# Patient Record
Sex: FEMALE | URBAN - METROPOLITAN AREA
[De-identification: names, ages, dates, MRNs, and addresses within clinical notes are randomized per-mention and may not be internally consistent; named-entity substitution may affect disease eponyms.]

---

## 2022-09-07 ENCOUNTER — OFFICE (OUTPATIENT)
Dept: URBAN - METROPOLITAN AREA CLINIC 28 | Facility: CLINIC | Age: 32
Setting detail: OPHTHALMOLOGY
End: 2022-09-07
Payer: COMMERCIAL

## 2022-09-07 DIAGNOSIS — H16.041: ICD-10-CM

## 2022-09-07 PROCEDURE — 92002 INTRM OPH EXAM NEW PATIENT: CPT | Performed by: OPHTHALMOLOGY

## 2022-09-07 ASSESSMENT — REFRACTION_CURRENTRX
OS_CYLINDER: 0.00
OD_OVR_VA: 20/
OD_CYLINDER: 0.00
OD_SPHERE: -1.75
OS_SPHERE: -1.75
OS_OVR_VA: 20/
OD_AXIS: 180
OS_AXIS: 180

## 2022-09-07 ASSESSMENT — REFRACTION_AUTOREFRACTION
OS_CYLINDER: -0.50
OD_CYLINDER: -1.00
OD_AXIS: 164
OS_AXIS: 009
OS_SPHERE: -2.75
OD_SPHERE: -2.25

## 2022-09-07 ASSESSMENT — SPHEQUIV_DERIVED
OS_SPHEQUIV: -3
OD_SPHEQUIV: -2.75

## 2022-09-07 ASSESSMENT — CONFRONTATIONAL VISUAL FIELD TEST (CVF)
OS_FINDINGS: FULL
OD_FINDINGS: FULL

## 2022-09-07 ASSESSMENT — KERATOMETRY
OS_K1POWER_DIOPTERS: 44.50
OD_K2POWER_DIOPTERS: 46.00
OS_AXISANGLE_DEGREES: 102
OD_AXISANGLE_DEGREES: 079
OD_K1POWER_DIOPTERS: 44.75
OS_K2POWER_DIOPTERS: 45.50

## 2022-09-07 ASSESSMENT — TONOMETRY
OS_IOP_MMHG: 13
OD_IOP_MMHG: 10

## 2022-09-07 ASSESSMENT — AXIALLENGTH_DERIVED
OD_AL: 23.9809
OS_AL: 24.2264

## 2022-09-07 ASSESSMENT — VISUAL ACUITY
OS_BCVA: 20/50-1
OD_BCVA: 20/70

## 2022-09-12 ENCOUNTER — OFFICE (OUTPATIENT)
Dept: URBAN - METROPOLITAN AREA CLINIC 28 | Facility: CLINIC | Age: 32
Setting detail: OPHTHALMOLOGY
End: 2022-09-12
Payer: COMMERCIAL

## 2022-09-12 DIAGNOSIS — H16.041: ICD-10-CM

## 2022-09-12 PROCEDURE — 92012 INTRM OPH EXAM EST PATIENT: CPT | Performed by: OPHTHALMOLOGY

## 2022-09-12 ASSESSMENT — AXIALLENGTH_DERIVED
OD_AL: 23.9393
OS_AL: 24.1751

## 2022-09-12 ASSESSMENT — REFRACTION_CURRENTRX
OS_OVR_VA: 20/
OS_SPHERE: -1.75
OS_CYLINDER: 0.00
OS_AXIS: 180
OD_OVR_VA: 20/
OD_AXIS: 180
OD_SPHERE: -1.75
OD_CYLINDER: 0.00

## 2022-09-12 ASSESSMENT — CONFRONTATIONAL VISUAL FIELD TEST (CVF)
OD_FINDINGS: FULL
OS_FINDINGS: FULL

## 2022-09-12 ASSESSMENT — KERATOMETRY
OS_K2POWER_DIOPTERS: 45.50
OD_K1POWER_DIOPTERS: 45.25
OD_K2POWER_DIOPTERS: 46.25
OS_AXISANGLE_DEGREES: 098
OS_K1POWER_DIOPTERS: 44.50
OD_AXISANGLE_DEGREES: 087

## 2022-09-12 ASSESSMENT — REFRACTION_AUTOREFRACTION
OS_AXIS: 005
OS_CYLINDER: -0.25
OD_AXIS: 175
OD_CYLINDER: -0.50
OS_SPHERE: -2.75
OD_SPHERE: -2.75

## 2022-09-12 ASSESSMENT — VISUAL ACUITY
OD_BCVA: 20/70-1
OS_BCVA: 20/50-1

## 2022-09-12 ASSESSMENT — CORNEAL SURGICAL SCARRING: OD_SCARRING: ANTERIOR STROMAL

## 2022-09-12 ASSESSMENT — SPHEQUIV_DERIVED
OD_SPHEQUIV: -3
OS_SPHEQUIV: -2.875

## 2022-09-12 ASSESSMENT — TONOMETRY: OD_IOP_MMHG: 12

## 2022-10-06 ENCOUNTER — OFFICE (OUTPATIENT)
Dept: URBAN - METROPOLITAN AREA CLINIC 28 | Facility: CLINIC | Age: 32
Setting detail: OPHTHALMOLOGY
End: 2022-10-06
Payer: COMMERCIAL

## 2022-10-06 DIAGNOSIS — H17.821: ICD-10-CM

## 2022-10-06 DIAGNOSIS — H16.223: ICD-10-CM

## 2022-10-06 PROBLEM — H16.041 CORNEAL ULCER; RIGHT EYE: Status: RESOLVED | Noted: 2022-09-07 | Resolved: 2022-10-06

## 2022-10-06 PROCEDURE — 92014 COMPRE OPH EXAM EST PT 1/>: CPT | Performed by: OPHTHALMOLOGY

## 2022-10-06 ASSESSMENT — REFRACTION_CURRENTRX
OS_OVR_VA: 20/
OS_CYLINDER: 0.00
OD_AXIS: 180
OD_OVR_VA: 20/
OS_AXIS: 180
OD_SPHERE: -1.75
OS_SPHERE: -1.75
OD_CYLINDER: 0.00

## 2022-10-06 ASSESSMENT — KERATOMETRY
OD_K1POWER_DIOPTERS: 44.75
OD_K2POWER_DIOPTERS: 46.00
OS_K1POWER_DIOPTERS: 44.50
OS_K2POWER_DIOPTERS: 45.50
OD_AXISANGLE_DEGREES: 083
OS_AXISANGLE_DEGREES: 099

## 2022-10-06 ASSESSMENT — TONOMETRY
OS_IOP_MMHG: 13
OD_IOP_MMHG: 15

## 2022-10-06 ASSESSMENT — CONFRONTATIONAL VISUAL FIELD TEST (CVF)
OS_FINDINGS: FULL
OD_FINDINGS: FULL

## 2022-10-06 ASSESSMENT — VISUAL ACUITY
OD_BCVA: 20/50-1
OS_BCVA: 20/50-1

## 2022-10-06 ASSESSMENT — SPHEQUIV_DERIVED
OS_SPHEQUIV: -3.25
OD_SPHEQUIV: -3.125

## 2022-10-06 ASSESSMENT — REFRACTION_AUTOREFRACTION
OS_CYLINDER: -0.50
OD_SPHERE: -2.75
OD_CYLINDER: -0.75
OS_AXIS: 002
OS_SPHERE: -3.00
OD_AXIS: 170

## 2022-10-06 ASSESSMENT — CORNEAL SURGICAL SCARRING: OD_SCARRING: ANTERIOR STROMAL

## 2022-10-06 ASSESSMENT — AXIALLENGTH_DERIVED
OD_AL: 24.1328
OS_AL: 24.3295

## 2022-10-06 ASSESSMENT — SUPERFICIAL PUNCTATE KERATITIS (SPK)
OD_SPK: T
OS_SPK: T

## 2022-10-06 ASSESSMENT — LID EXAM ASSESSMENTS
OS_MEIBOMITIS: 1+
OD_MEIBOMITIS: 1+

## 2023-11-15 ENCOUNTER — ASOB RESULT (OUTPATIENT)
Age: 33
End: 2023-11-15

## 2023-11-15 ENCOUNTER — APPOINTMENT (OUTPATIENT)
Dept: ANTEPARTUM | Facility: CLINIC | Age: 33
End: 2023-11-15
Payer: COMMERCIAL

## 2023-11-15 ENCOUNTER — TRANSCRIPTION ENCOUNTER (OUTPATIENT)
Age: 33
End: 2023-11-15

## 2023-11-15 PROCEDURE — 76805 OB US >/= 14 WKS SNGL FETUS: CPT

## 2023-11-15 PROCEDURE — 76817 TRANSVAGINAL US OBSTETRIC: CPT

## 2024-04-16 ENCOUNTER — APPOINTMENT (OUTPATIENT)
Dept: ANTEPARTUM | Facility: CLINIC | Age: 34
End: 2024-04-16
Payer: COMMERCIAL

## 2024-04-16 ENCOUNTER — ASOB RESULT (OUTPATIENT)
Age: 34
End: 2024-04-16

## 2024-04-16 PROCEDURE — 76819 FETAL BIOPHYS PROFIL W/O NST: CPT

## 2024-04-16 PROCEDURE — 76816 OB US FOLLOW-UP PER FETUS: CPT

## 2024-04-16 PROCEDURE — 76820 UMBILICAL ARTERY ECHO: CPT | Mod: 59

## 2024-05-05 ENCOUNTER — TRANSCRIPTION ENCOUNTER (OUTPATIENT)
Age: 34
End: 2024-05-05

## 2024-05-06 ENCOUNTER — INPATIENT (INPATIENT)
Facility: HOSPITAL | Age: 34
LOS: 1 days | Discharge: HOME | End: 2024-05-08
Attending: OBSTETRICS & GYNECOLOGY | Admitting: OBSTETRICS & GYNECOLOGY
Payer: COMMERCIAL

## 2024-05-06 VITALS
RESPIRATION RATE: 18 BRPM | HEART RATE: 72 BPM | DIASTOLIC BLOOD PRESSURE: 65 MMHG | TEMPERATURE: 98 F | SYSTOLIC BLOOD PRESSURE: 97 MMHG | OXYGEN SATURATION: 100 %

## 2024-05-06 LAB
BASOPHILS # BLD AUTO: 0.02 K/UL — SIGNIFICANT CHANGE UP (ref 0–0.2)
BASOPHILS NFR BLD AUTO: 0.2 % — SIGNIFICANT CHANGE UP (ref 0–2)
BLD GP AB SCN SERPL QL: NEGATIVE — SIGNIFICANT CHANGE UP
EOSINOPHIL # BLD AUTO: 0.02 K/UL — SIGNIFICANT CHANGE UP (ref 0–0.5)
EOSINOPHIL NFR BLD AUTO: 0.2 % — SIGNIFICANT CHANGE UP (ref 0–6)
HCT VFR BLD CALC: 35.9 % — SIGNIFICANT CHANGE UP (ref 34.5–45)
HGB BLD-MCNC: 11.6 G/DL — SIGNIFICANT CHANGE UP (ref 11.5–15.5)
IMM GRANULOCYTES NFR BLD AUTO: 0.4 % — SIGNIFICANT CHANGE UP (ref 0–0.9)
LYMPHOCYTES # BLD AUTO: 1.31 K/UL — SIGNIFICANT CHANGE UP (ref 1–3.3)
LYMPHOCYTES # BLD AUTO: 14.1 % — SIGNIFICANT CHANGE UP (ref 13–44)
MCHC RBC-ENTMCNC: 29 PG — SIGNIFICANT CHANGE UP (ref 27–34)
MCHC RBC-ENTMCNC: 32.3 GM/DL — SIGNIFICANT CHANGE UP (ref 32–36)
MCV RBC AUTO: 89.8 FL — SIGNIFICANT CHANGE UP (ref 80–100)
MONOCYTES # BLD AUTO: 0.45 K/UL — SIGNIFICANT CHANGE UP (ref 0–0.9)
MONOCYTES NFR BLD AUTO: 4.8 % — SIGNIFICANT CHANGE UP (ref 2–14)
NEUTROPHILS # BLD AUTO: 7.48 K/UL — HIGH (ref 1.8–7.4)
NEUTROPHILS NFR BLD AUTO: 80.3 % — HIGH (ref 43–77)
NRBC # BLD: 0 /100 WBCS — SIGNIFICANT CHANGE UP (ref 0–0)
PLATELET # BLD AUTO: 157 K/UL — SIGNIFICANT CHANGE UP (ref 150–400)
RBC # BLD: 4 M/UL — SIGNIFICANT CHANGE UP (ref 3.8–5.2)
RBC # FLD: 13.8 % — SIGNIFICANT CHANGE UP (ref 10.3–14.5)
RH IG SCN BLD-IMP: POSITIVE — SIGNIFICANT CHANGE UP
RH IG SCN BLD-IMP: POSITIVE — SIGNIFICANT CHANGE UP
WBC # BLD: 9.32 K/UL — SIGNIFICANT CHANGE UP (ref 3.8–10.5)
WBC # FLD AUTO: 9.32 K/UL — SIGNIFICANT CHANGE UP (ref 3.8–10.5)

## 2024-05-06 RX ORDER — PRAMOXINE HYDROCHLORIDE 150 MG/15G
1 AEROSOL, FOAM RECTAL EVERY 4 HOURS
Refills: 0 | Status: DISCONTINUED | OUTPATIENT
Start: 2024-05-06 | End: 2024-05-08

## 2024-05-06 RX ORDER — LANOLIN
1 OINTMENT (GRAM) TOPICAL EVERY 6 HOURS
Refills: 0 | Status: DISCONTINUED | OUTPATIENT
Start: 2024-05-06 | End: 2024-05-08

## 2024-05-06 RX ORDER — AER TRAVELER 0.5 G/1
1 SOLUTION RECTAL; TOPICAL EVERY 4 HOURS
Refills: 0 | Status: DISCONTINUED | OUTPATIENT
Start: 2024-05-06 | End: 2024-05-08

## 2024-05-06 RX ORDER — TETANUS TOXOID, REDUCED DIPHTHERIA TOXOID AND ACELLULAR PERTUSSIS VACCINE, ADSORBED 5; 2.5; 8; 8; 2.5 [IU]/.5ML; [IU]/.5ML; UG/.5ML; UG/.5ML; UG/.5ML
0.5 SUSPENSION INTRAMUSCULAR ONCE
Refills: 0 | Status: DISCONTINUED | OUTPATIENT
Start: 2024-05-06 | End: 2024-05-08

## 2024-05-06 RX ORDER — CITRIC ACID/SODIUM CITRATE 300-500 MG
15 SOLUTION, ORAL ORAL EVERY 6 HOURS
Refills: 0 | Status: DISCONTINUED | OUTPATIENT
Start: 2024-05-06 | End: 2024-05-06

## 2024-05-06 RX ORDER — HYDROCORTISONE 1 %
1 OINTMENT (GRAM) TOPICAL EVERY 6 HOURS
Refills: 0 | Status: DISCONTINUED | OUTPATIENT
Start: 2024-05-06 | End: 2024-05-08

## 2024-05-06 RX ORDER — MAGNESIUM HYDROXIDE 400 MG/1
30 TABLET, CHEWABLE ORAL
Refills: 0 | Status: DISCONTINUED | OUTPATIENT
Start: 2024-05-06 | End: 2024-05-08

## 2024-05-06 RX ORDER — SODIUM CHLORIDE 9 MG/ML
3 INJECTION INTRAMUSCULAR; INTRAVENOUS; SUBCUTANEOUS EVERY 8 HOURS
Refills: 0 | Status: DISCONTINUED | OUTPATIENT
Start: 2024-05-06 | End: 2024-05-08

## 2024-05-06 RX ORDER — CHLORHEXIDINE GLUCONATE 213 G/1000ML
1 SOLUTION TOPICAL DAILY
Refills: 0 | Status: DISCONTINUED | OUTPATIENT
Start: 2024-05-06 | End: 2024-05-06

## 2024-05-06 RX ORDER — OXYTOCIN 10 UNIT/ML
333.33 VIAL (ML) INJECTION
Qty: 20 | Refills: 0 | Status: DISCONTINUED | OUTPATIENT
Start: 2024-05-06 | End: 2024-05-06

## 2024-05-06 RX ORDER — SIMETHICONE 80 MG/1
80 TABLET, CHEWABLE ORAL EVERY 4 HOURS
Refills: 0 | Status: DISCONTINUED | OUTPATIENT
Start: 2024-05-06 | End: 2024-05-08

## 2024-05-06 RX ORDER — FENTANYL/BUPIVACAINE/NS/PF 2MCG/ML-.1
250 PLASTIC BAG, INJECTION (ML) INJECTION
Refills: 0 | Status: DISCONTINUED | OUTPATIENT
Start: 2024-05-06 | End: 2024-05-06

## 2024-05-06 RX ORDER — BENZOCAINE 10 %
1 GEL (GRAM) MUCOUS MEMBRANE EVERY 6 HOURS
Refills: 0 | Status: DISCONTINUED | OUTPATIENT
Start: 2024-05-06 | End: 2024-05-08

## 2024-05-06 RX ORDER — DIBUCAINE 1 %
1 OINTMENT (GRAM) RECTAL EVERY 6 HOURS
Refills: 0 | Status: DISCONTINUED | OUTPATIENT
Start: 2024-05-06 | End: 2024-05-08

## 2024-05-06 RX ORDER — OXYTOCIN 10 UNIT/ML
41.67 VIAL (ML) INJECTION
Qty: 20 | Refills: 0 | Status: DISCONTINUED | OUTPATIENT
Start: 2024-05-06 | End: 2024-05-08

## 2024-05-06 RX ORDER — OXYTOCIN 10 UNIT/ML
2 VIAL (ML) INJECTION
Qty: 30 | Refills: 0 | Status: DISCONTINUED | OUTPATIENT
Start: 2024-05-06 | End: 2024-05-06

## 2024-05-06 RX ORDER — ACETAMINOPHEN 500 MG
975 TABLET ORAL
Refills: 0 | Status: DISCONTINUED | OUTPATIENT
Start: 2024-05-06 | End: 2024-05-08

## 2024-05-06 RX ORDER — DIPHENHYDRAMINE HCL 50 MG
25 CAPSULE ORAL EVERY 6 HOURS
Refills: 0 | Status: DISCONTINUED | OUTPATIENT
Start: 2024-05-06 | End: 2024-05-08

## 2024-05-06 RX ORDER — SODIUM CHLORIDE 9 MG/ML
1000 INJECTION, SOLUTION INTRAVENOUS
Refills: 0 | Status: DISCONTINUED | OUTPATIENT
Start: 2024-05-06 | End: 2024-05-06

## 2024-05-06 RX ORDER — OXYCODONE HYDROCHLORIDE 5 MG/1
5 TABLET ORAL
Refills: 0 | Status: DISCONTINUED | OUTPATIENT
Start: 2024-05-06 | End: 2024-05-08

## 2024-05-06 RX ORDER — IBUPROFEN 200 MG
600 TABLET ORAL EVERY 6 HOURS
Refills: 0 | Status: COMPLETED | OUTPATIENT
Start: 2024-05-06 | End: 2025-04-04

## 2024-05-06 RX ORDER — IBUPROFEN 200 MG
600 TABLET ORAL EVERY 6 HOURS
Refills: 0 | Status: DISCONTINUED | OUTPATIENT
Start: 2024-05-06 | End: 2024-05-08

## 2024-05-06 RX ORDER — KETOROLAC TROMETHAMINE 30 MG/ML
30 SYRINGE (ML) INJECTION ONCE
Refills: 0 | Status: DISCONTINUED | OUTPATIENT
Start: 2024-05-06 | End: 2024-05-06

## 2024-05-06 RX ADMIN — Medication 2 MILLIUNIT(S)/MIN: at 09:36

## 2024-05-06 RX ADMIN — SODIUM CHLORIDE 3 MILLILITER(S): 9 INJECTION INTRAMUSCULAR; INTRAVENOUS; SUBCUTANEOUS at 22:00

## 2024-05-06 RX ADMIN — SODIUM CHLORIDE 125 MILLILITER(S): 9 INJECTION, SOLUTION INTRAVENOUS at 09:00

## 2024-05-06 RX ADMIN — Medication 975 MILLIGRAM(S): at 20:53

## 2024-05-06 RX ADMIN — Medication 125 MILLIUNIT(S)/MIN: at 14:27

## 2024-05-06 RX ADMIN — Medication 975 MILLIGRAM(S): at 21:43

## 2024-05-06 NOTE — OB NEONATOLOGY/PEDIATRICIAN DELIVERY SUMMARY - NSPEDSNEONOTESA_OBGYN_ALL_OB_FT
NICU called to delivery for cat II tracing. Vacuum used, nuchal x1. Baby emerged with good tone, crying. Infant placed on open warmer, dried, bulb and deep suctioned, stimulated. PE notable for head molding, otherwise WNL. Cleared for WBN, at this time.

## 2024-05-06 NOTE — OB PROVIDER LABOR PROGRESS NOTE - NS_SUBJECTIVE/OBJECTIVE_OBGYN_ALL_OB_FT
EKG obtained for maternal bradycardia  OB floor anesthesiology ernestina consulted and EKG read as sinus bradycardia to 44 bpm  Patient remains asymptomatic
Patient seen at bedside with attending  JARRED fully  Patient has urge to start pushing
EFM reviewed  Patient now resting comfortably with epidural in place
pt requesting epidural

## 2024-05-06 NOTE — OB RN PATIENT PROFILE - ARE SIGNIFICANT INDICATORS COMPLETE.
Airway  Date/Time: 8/10/2022 2:42 PM  Urgency: Elective      General Information and Staff    Patient location during procedure: OR  Anesthesiologist: Lenore Donis MD  Resident/CRNA: Ev Anaya CRNA  Performed: CRNA     Indications and Patient Condition  Indications for airway management: anesthesia  Sedation level: deep  Preoxygenated: yes  Patient position: sniffing  Mask difficulty assessment: 0 - not attempted    Final Airway Details  Final airway type: supraglottic airway      Successful airway: classic  Size 3      Number of attempts at approach: 1  Ventilation between attempts: supraglottic airway    Additional Comments  Atraumatic insertion, dentition and lips as preop No Yes

## 2024-05-06 NOTE — OB RN DELIVERY SUMMARY - BABY A: APGAR 5 MIN MUSCLE TONE, DELIVERY
(2) well flexed Quality 110: Preventive Care And Screening: Influenza Immunization: Influenza immunization was not ordered or administered, reason not given Detail Level: Detailed Quality 47: Advance Care Plan: Advance care planning not documented, reason not otherwise specified. Quality 111:Pneumonia Vaccination Status For Older Adults: Pneumococcal vaccine (PPSV23) administered on or after patientâs 60th birthday and before the end of the measurement period Quality 431: Preventive Care And Screening: Unhealthy Alcohol Use - Screening: Patient not identified as an unhealthy alcohol user when screened for unhealthy alcohol use using a systematic screening method Quality 130: Documentation Of Current Medications In The Medical Record: Current Medications Documented Quality 226: Preventive Care And Screening: Tobacco Use: Screening And Cessation Intervention: Patient screened for tobacco use and is an ex/non-smoker

## 2024-05-06 NOTE — OB PROVIDER DELIVERY SUMMARY - NSSELHIDDEN_OBGYN_ALL_OB_FT
[NS_DeliveryAttending1_OBGYN_ALL_OB_FT:[id number]],[NS_DeliveryAssist1_OBGYN_ALL_OB_FT:[id number]=]

## 2024-05-06 NOTE — OB RN PATIENT PROFILE - FALL HARM RISK - UNIVERSAL INTERVENTIONS
Bed in lowest position, wheels locked, appropriate side rails in place/Call bell, personal items and telephone in reach/Instruct patient to call for assistance before getting out of bed or chair/Non-slip footwear when patient is out of bed/[place] to call system/Physically safe environment - no spills, clutter or unnecessary equipment/Purposeful Proactive Rounding/Room/bathroom lighting operational, light cord in reach

## 2024-05-06 NOTE — LACTATION INITIAL EVALUATION - NS LACT CON REASON FOR REQ
Full term baby girl seen around 4 hrs of life. Baby placed skin to skin on mothers chest and complete breastfeeding education was provided to primip mom, colostrum is expressible bilaterally upon hand expression as shown. Breasts small, but mother reports to have had breasts chnages in pregnancy such as areolar darkening and slight growth. Positioning and latch strategies taught using a laid-back cradle hold. Baby attained a deep, sustained latch to the right breast, feeding with an organized, rhythmic suck for 20min. Mother confirms a strong but comfortable tug of nipple, also taught how to gently tug down on chin to widen gape and deepen latch as needed. Advised hand expression and offering baby drops between feeds for stim and supplemental purposes. Responsive, frequent feeds, increased ssc, and rooming-in were all encouraged. All questions were answered, mother verbalized understanding of teaching and info given. Referral for lactation telehealth was placed for further support s/p d/c./primaparous mom

## 2024-05-06 NOTE — OB RN PATIENT PROFILE - CENTRAL VENOUS CATHETER
Today I provided prescription for Tylenol, ibuprofen, Robaxin.  Take as directed for pain control.  Do not drive after taking Robaxin.  Follow-up with comprehensive spine program for continued treatment of low back pain.  Follow-up with primary care provider as needed.  Return to ED for new or worsening symptoms.  
no

## 2024-05-06 NOTE — OB RN DELIVERY SUMMARY - NS_SEPSISRSKCALC_OBGYN_ALL_OB_FT
EOS calculated successfully. EOS Risk Factor: 0.5/1000 live births ([facility] national incidence); GA=40w5d; Temp=98.2; ROM=6.467; GBS='Negative'; Antibiotics='No antibiotics or any antibiotics < 2 hrs prior to birth'

## 2024-05-06 NOTE — OB RN DELIVERY SUMMARY - NSSELHIDDEN_OBGYN_ALL_OB_FT
[NS_DeliveryAttending1_OBGYN_ALL_OB_FT:[id number]],[NS_DeliveryAssist1_OBGYN_ALL_OB_FT:[id number]=],[NS_DeliveryRN_OBGYN_ALL_OB_FT:[id number]=]

## 2024-05-06 NOTE — OB PROVIDER LABOR PROGRESS NOTE - NS_OBIHICONTRACTIONPATTERNDETAILS_OBGYN_ALL_OB_FT
irregular contractions 4 in 10min
irregular contractions 3 in 10min
irregular contractions 3-4 in 10min
ut ctxs q 2-4min

## 2024-05-06 NOTE — OB PROVIDER H&P - HISTORY OF PRESENT ILLNESS
Subjective:  HPI: 33y Female  at 40w5d presents for contractions. She reports contractions in increasing frequency and pain intensity for 1-2 days. She presented to triage and was examined by her primary OBGYN to be 2-3/100/0.  - Fetal movement: endorses  - Contractions: as above  - Leakage of fluid: denies  - Vaginal bleeding: denies    Antepartum:  - Pregnancy type: IVF, own egg  - Testing: PGT wnl  - Anatomy scan: wnl  - GCT/GTT: passed  - Hypertensive disorders of pregnancy: denies  - GBS status: negative  - EFW: 3000g by [place]    - ObHx: G1 current  - GynHx: denies  - PMH: denies  - PSH: denies  - SH: denies toxic habits  - Meds: Prenatal vitamins  - Allergies: Allergy Status Unknown          Objective:    T(C): 36.4 (24 @ 06:47), Max: 36.5 (24 @ 06:43)  HR: 53 (24 @ 06:47) (53 - 72)  BP: 130/84 (24 @ 06:47) (97/65 - 130/84)  RR: 16 (24 @ 06:47) (16 - 18)  SpO2: 99% (24 @ 06:47) (99% - 100%)    General: No apparent distress; Lying comfortably in bed  Pulmonary: No increased work of breathing  Abdomen: Soft; Nontender; Gravid  Extremities: Trace pedal edema bilaterally; No calf tenderness bilaterally  Neurological: Gross movements intact  Psychiatric: Appropriate mood and affect  Skin: No rashes or jaundice    SVE: 2-3/100/0 by attending  TAUS: cephalic fetus  (sonogram attached)    FHT: baseline 115; moderate variability; +accels; -decels; category I tracing  TOCO: irregular contractions 1-2 in 10min    Labs / Studies:                          11.6   9.32  )-----------( 157      ( 06 May 2024 06:52 )             35.9                     Assessment:  32 yo  at 40w5d presents for early labor. Maternal and fetal statuses reassuring. Plan for pain management with epidural then augmentation of labor with pitocin      Plan:  - Admit to L&D floor  - Labs sent: CBC, Type/screen, Syphilis screen  - Diet: CLD  - Continuous electronic fetal monitoring and tocometry  - Pain management with epidural  - GBS status as above: no antibiotics needed at this time  - All questions were answered and concerns addressed. Patient verbally expressed understanding.  - Discussed with senior resident Dr. Rizzo  - Discussed with attending physician Dr. Fuentes

## 2024-05-06 NOTE — OB PROVIDER LABOR PROGRESS NOTE - ASSESSMENT
- Category II tracing  - Pitocin 6mU/min; continue to titrate as needed to maintain 4 contractions in 10min  - Begin pushing in second stage of labor now  - Anticipate vaginal delivery soon

## 2024-05-06 NOTE — OB PROVIDER H&P - NSLOWPPHRISK_OBGYN_A_OB
No previous uterine incision/[place] Pregnancy/Less than or equal to 4 previous vaginal births/No known bleeding disorder/No history of postpartum hemorrhage/No other PPH risks indicated

## 2024-05-06 NOTE — OB PROVIDER LABOR PROGRESS NOTE - NS_OBIHIFHRDETAILS_OBGYN_ALL_OB_FT
baseline 120;  moderate variability; +accels; non recurrent late decels; category II tracing overall reassuring with accels and moderate variability
baseline 140; moderate variability; +accels; -decels; category I tracing
baseline 140; moderate variability; +accels; non recurrent variable decels; category II tracing overall reassuring with moderate variability and accels
120 moderate variability, accels present

## 2024-05-06 NOTE — OB PROVIDER LABOR PROGRESS NOTE - ASSESSMENT
- Category I tracing  - Plan to start pitocin soon for augmentation of labor  - Amniotomy per attending - Category I tracing  - Plan to start pitocin soon for augmentation of labor cultural considerations

## 2024-05-06 NOTE — PRE-ANESTHESIA EVALUATION ADULT - NSANTHOSAYNRD_GEN_A_CORE
No. JIMBO screening performed.  STOP BANG Legend: 0-2 = LOW Risk; 3-4 = INTERMEDIATE Risk; 5-8 = HIGH Risk
No. JIMBO screening performed.  STOP BANG Legend: 0-2 = LOW Risk; 3-4 = INTERMEDIATE Risk; 5-8 = HIGH Risk

## 2024-05-06 NOTE — OB PROVIDER DELIVERY SUMMARY - NSPROVIDERDELIVERYNOTE_OBGYN_ALL_OB_FT
32 y/o  presenting at 40w5d EGA in early labor. Pt received an epidural for analgesia. AROM performed at 0750 for clear fluid. Labor was augmented with pitocin that started at 0935. Patient became fully dilated at 1249 and began pushing. Cat II FHT noted while pushing due to recurrent variable vs. late decelerations and decision made to perform vacuum-assisted vaginal delivery. Risks/benefits/alternatives discussed w/ pt and partner at bedside and verbal consent obtained. Vaginal exam reconfirmed KAUSHAL position at 3+ station. A Kiwi vacuum device was applied and progressive descent was noted when pulling. A right mediolateral episiotomy was cut. The vacuum cup did not pop off and there was one pull. The cup was removed after the head was delivered. Nuchal cord x1 noted at the time of delivery of fetal head and reduced at the perineum. The infant was delivered without difficulty at 1415 with APGAR 9/9 and BW 3220g. Placenta delivered spontaneously intact at 1417 with 3VC. Cervix was inspected and found intact. A second degree laceration was repaired w/ 2-0 vicryl suture and RML was repaired w/ 2-0 chromic suture without complications. Rectal exam unremarkable. Excellent hemostasis. EBL 200cc.  Patient tolerated the procedure well. Mother and infant in stable conditions. Dr. Roman present for entire procedure.

## 2024-05-06 NOTE — LACTATION INITIAL EVALUATION - LACTATION INTERVENTIONS
initiate/review safe skin-to-skin/initiate/review hand expression/initiate/review techniques for position and latch/post discharge community resources provided/review techniques to increase milk supply/reviewed components of an effective feeding and at least 8 effective feedings per day required/reviewed importance of monitoring infant diapers, the breastfeeding log, and minimum output each day/reviewed risks of unnecessary formula supplementation/reviewed feeding on demand/by cue at least 8 times a day

## 2024-05-06 NOTE — OB PROVIDER LABOR PROGRESS NOTE - ASSESSMENT
- Category II tracing overall reassuring  - Reposition and resuscitate as needed  - Pitocin 2mU/min; titrate as needed to maintain contractions 4 in 10min  - Continue to monitor bradycardia

## 2024-05-06 NOTE — OB RN TRIAGE NOTE - FALL HARM RISK - UNIVERSAL INTERVENTIONS
Bed in lowest position, wheels locked, appropriate side rails in place/Call bell, personal items and telephone in reach/Instruct patient to call for assistance before getting out of bed or chair/Non-slip footwear when patient is out of bed/[place] to call system/Physically safe environment - no spills, clutter or unnecessary equipment/Purposeful Proactive Rounding/Room/bathroom lighting operational, light cord in reach

## 2024-05-06 NOTE — PRE-ANESTHESIA EVALUATION ADULT - NSANTHPEFT_GEN_ALL_CORE
cta
General: Appearance is consistent with chronological age. No abnormal facies.  EENT: Anicteric sclera; oropharynx clear, moist mucus membranes  Cardiovascular:  Rate and rhythm evaluated  Respiratory: Unlabored breathing  Neurological: Awake and alert, moves all extremities  Constitutional: MET>4

## 2024-05-07 ENCOUNTER — TRANSCRIPTION ENCOUNTER (OUTPATIENT)
Age: 34
End: 2024-05-07

## 2024-05-07 PROBLEM — Z00.00 ENCOUNTER FOR PREVENTIVE HEALTH EXAMINATION: Status: ACTIVE | Noted: 2024-05-07

## 2024-05-07 LAB — T PALLIDUM AB TITR SER: NEGATIVE — SIGNIFICANT CHANGE UP

## 2024-05-07 RX ORDER — ACETAMINOPHEN 500 MG
3 TABLET ORAL
Qty: 0 | Refills: 0 | DISCHARGE
Start: 2024-05-07

## 2024-05-07 RX ORDER — IBUPROFEN 200 MG
1 TABLET ORAL
Qty: 0 | Refills: 0 | DISCHARGE
Start: 2024-05-07

## 2024-05-07 RX ORDER — BENZOCAINE 10 %
1 GEL (GRAM) MUCOUS MEMBRANE
Qty: 0 | Refills: 0 | DISCHARGE
Start: 2024-05-07

## 2024-05-07 RX ADMIN — Medication 1 TABLET(S): at 15:00

## 2024-05-07 RX ADMIN — Medication 600 MILLIGRAM(S): at 18:12

## 2024-05-07 RX ADMIN — Medication 975 MILLIGRAM(S): at 03:15

## 2024-05-07 RX ADMIN — Medication 1 APPLICATION(S): at 19:19

## 2024-05-07 RX ADMIN — Medication 975 MILLIGRAM(S): at 21:00

## 2024-05-07 RX ADMIN — Medication 975 MILLIGRAM(S): at 15:00

## 2024-05-07 RX ADMIN — SODIUM CHLORIDE 3 MILLILITER(S): 9 INJECTION INTRAMUSCULAR; INTRAVENOUS; SUBCUTANEOUS at 06:40

## 2024-05-07 RX ADMIN — Medication 600 MILLIGRAM(S): at 00:15

## 2024-05-07 RX ADMIN — Medication 600 MILLIGRAM(S): at 06:28

## 2024-05-07 RX ADMIN — Medication 975 MILLIGRAM(S): at 08:41

## 2024-05-07 RX ADMIN — Medication 600 MILLIGRAM(S): at 12:06

## 2024-05-07 RX ADMIN — Medication 600 MILLIGRAM(S): at 07:00

## 2024-05-07 RX ADMIN — Medication 600 MILLIGRAM(S): at 01:06

## 2024-05-07 RX ADMIN — Medication 600 MILLIGRAM(S): at 23:54

## 2024-05-07 RX ADMIN — Medication 975 MILLIGRAM(S): at 04:00

## 2024-05-07 NOTE — PROGRESS NOTE ADULT - SUBJECTIVE AND OBJECTIVE BOX
Patient evaluated at bedside this morning, resting comfortable in bed, no acute events overnight.  She reports pain is well controlled with tylenol and motrin.  She denies headache, dizziness, chest pain, palpitations, shortness of breath, nausea, vomiting, fever, chills, heavy vaginal bleeding. She has been ambulating without assistance, voiding spontaneously.  Tolerating food well, without nausea/vomit.      Physical Exam:  T(C): 36.3 (05-07-24 @ 06:00), Max: 36.9 (05-06-24 @ 22:17)  HR: 55 (05-07-24 @ 06:00) (55 - 77)  BP: 95/64 (05-07-24 @ 06:00) (95/60 - 121/71)  RR: 18 (05-07-24 @ 06:00) (17 - 18)  SpO2: 96% (05-07-24 @ 06:00) (96% - 97%)    GA: NAD, A&O x 3  Pulm: no increased work of breathing  Abd: soft, nontender, nondistended, no rebound or guarding, uterus firm.  Extremities: no swelling or calf tenderness                          11.6   9.32  )-----------( 157      ( 06 May 2024 06:52 )             35.9           acetaminophen     Tablet .. 975 milliGRAM(s) Oral <User Schedule>  benzocaine 20%/menthol 0.5% Spray 1 Spray(s) Topical every 6 hours PRN  dibucaine 1% Ointment 1 Application(s) Topical every 6 hours PRN  diphenhydrAMINE 25 milliGRAM(s) Oral every 6 hours PRN  diphtheria/tetanus/pertussis (acellular) Vaccine (Adacel) 0.5 milliLiter(s) IntraMuscular once  hydrocortisone 1% Cream 1 Application(s) Topical every 6 hours PRN  ibuprofen  Tablet. 600 milliGRAM(s) Oral every 6 hours  lanolin Ointment 1 Application(s) Topical every 6 hours PRN  magnesium hydroxide Suspension 30 milliLiter(s) Oral two times a day PRN  oxyCODONE    IR 5 milliGRAM(s) Oral every 3 hours PRN  oxytocin Infusion 41.667 milliUNIT(s)/Min IV Continuous <Continuous>  pramoxine 1%/zinc 5% Cream 1 Application(s) Topical every 4 hours PRN  prenatal multivitamin 1 Tablet(s) Oral daily  simethicone 80 milliGRAM(s) Chew every 4 hours PRN  sodium chloride 0.9% lock flush 3 milliLiter(s) IV Push every 8 hours  witch hazel Pads 1 Application(s) Topical every 4 hours PRN

## 2024-05-07 NOTE — DISCHARGE NOTE OB - CARE PROVIDER_API CALL
Juan Antonio Roman  Obstetrics and Gynecology  [address]  [place] [address]  Phone: [phone]  Fax: [phone]  Follow Up Time:

## 2024-05-07 NOTE — DISCHARGE NOTE OB - MEDICATION SUMMARY - MEDICATIONS TO TAKE
I will START or STAY ON the medications listed below when I get home from the hospital:    ibuprofen 600 mg oral tablet  -- 1 tab(s) by mouth every 6 hours  -- Indication: For pain    acetaminophen 325 mg oral tablet  -- 3 tab(s) by mouth every 6 hours  -- Indication: For pain    benzocaine 20% topical spray  -- 1 Apply on skin to affected area every 6 hours As needed for Perineal discomfort  -- Indication: For pain    Prenatal Multivitamins with Folic Acid 1 mg oral tablet  -- 1 tab(s) by mouth once a day  -- Indication: For pregnancy

## 2024-05-07 NOTE — PROGRESS NOTE ADULT - ASSESSMENT
A/P 33y s/p VAVD w/ RML, PPD1 , stable, meeting postpartum milestones   - Pain: well controlled on tylenol/motrin  - GI: Tolerating regular diet, passing gas  - : urinating without difficulty/pain  - DVT prophylaxis: ambulating frequently  - Dispo: PPD 2, unless otherwise specified

## 2024-05-07 NOTE — DISCHARGE NOTE OB - PATIENT PORTAL LINK FT
You can access the FollowMyHealth Patient Portal offered by [facility] by registering at the following website: [email or web address]. By joining [facility]’s FollowMyHealth portal, you will also be able to view your health information using other applications (apps) compatible with our system.

## 2024-05-08 VITALS
TEMPERATURE: 98 F | DIASTOLIC BLOOD PRESSURE: 67 MMHG | SYSTOLIC BLOOD PRESSURE: 109 MMHG | RESPIRATION RATE: 18 BRPM | OXYGEN SATURATION: 97 % | HEART RATE: 50 BPM

## 2024-05-08 PROCEDURE — 85025 COMPLETE CBC W/AUTO DIFF WBC: CPT

## 2024-05-08 PROCEDURE — 86900 BLOOD TYPING SEROLOGIC ABO: CPT

## 2024-05-08 PROCEDURE — 86850 RBC ANTIBODY SCREEN: CPT

## 2024-05-08 PROCEDURE — 86901 BLOOD TYPING SEROLOGIC RH(D): CPT

## 2024-05-08 PROCEDURE — 36415 COLL VENOUS BLD VENIPUNCTURE: CPT

## 2024-05-08 PROCEDURE — 59050 FETAL MONITOR W/REPORT: CPT

## 2024-05-08 PROCEDURE — 86780 TREPONEMA PALLIDUM: CPT

## 2024-05-08 RX ADMIN — Medication 600 MILLIGRAM(S): at 12:34

## 2024-05-08 RX ADMIN — Medication 600 MILLIGRAM(S): at 06:46

## 2024-05-08 RX ADMIN — Medication 975 MILLIGRAM(S): at 15:01

## 2024-05-08 RX ADMIN — Medication 1 TABLET(S): at 12:34

## 2024-05-08 RX ADMIN — Medication 975 MILLIGRAM(S): at 03:02

## 2024-05-08 RX ADMIN — Medication 975 MILLIGRAM(S): at 08:59

## 2024-05-08 NOTE — PROGRESS NOTE ADULT - ASSESSMENT
A/P  33y s/p VAVD with RML episiotomy, PPD# 2, stable, meeting postpartum milestones   - Pain: well controlled on analgesics as above  - GI: Tolerating regular diet  - : urinating without difficulty/pain  - DVT prophylaxis: ambulating frequently  - Dispo: PPD 2, unless otherwise specified

## 2024-05-08 NOTE — PROGRESS NOTE ADULT - SUBJECTIVE AND OBJECTIVE BOX
Patient evaluated at bedside this morning, resting comfortable in bed, no acute events overnight. She reports pain is well-controlled.  She denies headache, dizziness, changes in vision, chest pain, palpitations, shortness of breath, RUQ pain, nausea, vomiting, fever, chills, heavy vaginal bleeding.  She has been ambulating without assistance, voiding spontaneously, tolerating food.      Physical Exam:  T(C): 36.8 (05-08-24 @ 06:00), Max: 36.8 (05-08-24 @ 06:00)  HR: 50 (05-08-24 @ 06:00) (50 - 50)  BP: 109/67 (05-08-24 @ 06:00) (109/67 - 109/67)  RR: 18 (05-08-24 @ 06:00) (18 - 18)  SpO2: 97% (05-08-24 @ 06:00) (97% - 97%)    Gen: no apparent distress  Pulm: no increased work of breathing  Abd: soft, nontender, nondistended, no rebound or guarding, uterus firm  Extremities: trace pedal edema bilaterally, no calf tenderness bilaterally                          11.6   9.32  )-----------( 157      ( 06 May 2024 06:52 )             35.9           acetaminophen     Tablet .. 975 milliGRAM(s) Oral <User Schedule>  benzocaine 20%/menthol 0.5% Spray 1 Spray(s) Topical every 6 hours PRN  dibucaine 1% Ointment 1 Application(s) Topical every 6 hours PRN  diphenhydrAMINE 25 milliGRAM(s) Oral every 6 hours PRN  diphtheria/tetanus/pertussis (acellular) Vaccine (Adacel) 0.5 milliLiter(s) IntraMuscular once  hydrocortisone 1% Cream 1 Application(s) Topical every 6 hours PRN  ibuprofen  Tablet. 600 milliGRAM(s) Oral every 6 hours  lanolin Ointment 1 Application(s) Topical every 6 hours PRN  magnesium hydroxide Suspension 30 milliLiter(s) Oral two times a day PRN  oxyCODONE    IR 5 milliGRAM(s) Oral every 3 hours PRN  oxytocin Infusion 41.667 milliUNIT(s)/Min IV Continuous <Continuous>  pramoxine 1%/zinc 5% Cream 1 Application(s) Topical every 4 hours PRN  prenatal multivitamin 1 Tablet(s) Oral daily  simethicone 80 milliGRAM(s) Chew every 4 hours PRN  sodium chloride 0.9% lock flush 3 milliLiter(s) IV Push every 8 hours  witch hazel Pads 1 Application(s) Topical every 4 hours PRN

## 2024-05-14 DIAGNOSIS — Z34.83 ENCOUNTER FOR SUPERVISION OF OTHER NORMAL PREGNANCY, THIRD TRIMESTER: ICD-10-CM

## 2024-05-14 DIAGNOSIS — O32.4XX0 MATERNAL CARE FOR HIGH HEAD AT TERM, NOT APPLICABLE OR UNSPECIFIED: ICD-10-CM

## 2024-05-14 DIAGNOSIS — Z3A.40 40 WEEKS GESTATION OF PREGNANCY: ICD-10-CM

## 2024-05-14 DIAGNOSIS — O48.0 POST-TERM PREGNANCY: ICD-10-CM
